# Patient Record
Sex: MALE | Race: WHITE
[De-identification: names, ages, dates, MRNs, and addresses within clinical notes are randomized per-mention and may not be internally consistent; named-entity substitution may affect disease eponyms.]

---

## 2018-11-28 ENCOUNTER — HOSPITAL ENCOUNTER (EMERGENCY)
Dept: HOSPITAL 74 - FER | Age: 38
Discharge: HOME | End: 2018-11-28
Payer: COMMERCIAL

## 2018-11-28 VITALS — DIASTOLIC BLOOD PRESSURE: 100 MMHG | HEART RATE: 88 BPM | SYSTOLIC BLOOD PRESSURE: 147 MMHG | TEMPERATURE: 98.4 F

## 2018-11-28 VITALS — BODY MASS INDEX: 25.4 KG/M2

## 2018-11-28 DIAGNOSIS — F41.9: ICD-10-CM
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
## 2018-11-28 DIAGNOSIS — M79.18: Primary | ICD-10-CM
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
## 2018-11-28 NOTE — PDOC
History of Present Illness





- General


Chief Complaint: Injury


Stated Complaint: fell down stairs


Time Seen by Provider: 11/28/18 11:05


History Source: Patient


Exam Limitations: No Limitations





- History of Present Illness


Initial Comments: 





11/28/18 11:05








Mr Carney is a 37 yo M who presents ambulatory to the ER with a complaint of 

body aches s/p fall down stairs


Pt states he slipped and fell down 10 stairs yesterday at approximately 6:00pm


Pt was able to get himself to standing


No LOC, No amnesia


After this happened, he was able to get up on his own


No weakness


(+) dizziness


No abdominal pain


(+) Right shoulder pain


(+) right hip pain





PMH: Anxiety


PSH: Denies


Meds: Valium prn


ALL: PCN


Social: Denies alcohol use


FH: non contributory





ROS:


GENERAL/CONSTITUTIONAL: No: fever, chills, weakness, loss of appetite.


HEAD, EYES, EARS, NOSE AND THROAT: No: change in vision, ear pain, discharge, 

sore throat, throat swelling.


CARDIOVASCULAR: Yes: dizziness No: chest pain, palpitations, syncope


RESPIRATORY: No: cough, shortness of breath 


GASTROINTESTINAL: No: nausea, vomiting, diarrhea, abdominal pain


GENITOURINARY: No: dysuria, hematuria 


MUSCULOSKELETAL: Yes Right hip pain No: back pain, neck pain, muscle swelling 

or pain


SKIN: No: lesions, pallor, rash or easy bruising.


NEUROLOGIC: Yes: vertigo  No: headache,  paresthesias, weakness 


 





PE:


GENERAL: The patient is in no acute distress, pt appears anxious.


HEAD: Normal with no signs of trauma.


EYES: PERRLA, EOMI, sclera anicteric, conjunctiva clear.


ENT: Ears normal, nares patent, oropharynx clear without exudates.  Moist 

mucous membranes. no hemotympanum


NECK: Normal range of motion, supple without midline tenderness


LUNGS: Breath sounds equal, clear to auscultation bilaterally.  No wheezes, and 

no crackles.


HEART:Regular rate and rhythm, normal S1 and S2 without murmur, rub or gallop.


ABDOMEN: Soft, nontender, normoactive bowel sounds.  No guarding, no rebound.   


EXTREMITIES: Normal range of motion, no edema.   


NEUROLOGICAL: Cranial nerves II through XII grossly intact.  Normal speech.  No 

focal neurological deficits. 


MUSCULOSKELETAL:  Back non-tender to palpation, right hip is tender to palpation

, no deformity,


SKIN: superficial abrasion of the right hip





11/28/18 11:32








Past History





- Past Medical History


Allergies/Adverse Reactions: 


 Allergies











Allergy/AdvReac Type Severity Reaction Status Date / Time


 


Penicillins Allergy   Verified 11/28/18 11:04











Home Medications: 


Ambulatory Orders





Diazepam [Valium] 5 mg PO ONCE PRN 03/23/14 


Acetaminophen W/ Codeine #3 [Tylenol # 3 -] 1 tab PO TID PRN #12 tablet MDD 3 11 /28/18 


Aspirin [ASA -] 81 mg PO PRN PRN 11/28/18 


Lidocaine 5% Patch [Lidoderm Patch -] 1 patch TP DAILY PRN #30 patch 11/28/18 


Methocarbamol [Robaxin -] 500 mg PO TID PRN #30 tablet 11/28/18 


Naproxen Sodium 220 mg PO BID PRN #30 tablet 11/28/18 








Psychiatric Problems: Yes (ANXIETY)





- Suicide/Smoking/Psychosocial Hx


Smoking History: Never smoked


Number of Cigarettes Smoked Daily: 0


Hx Alcohol Use: Yes ("SOCIAL")





Medical Decision Making





- Medical Decision Making





11/28/18 11:47


Will do CT head


Will do Xray shoulder and hip


11/28/18 12:28








CT head - negative


XRay Shoulder - negative


XRay Hip - appears negative





Will discharge to home on Robaxin, Aleeve, Lidoderm patches


Tylenol #3 for severe pain


Follow up with podiatry





*DC/Admit/Observation/Transfer


Diagnosis at time of Disposition: 


 Musculoskeletal pain








- Discharge Dispostion


Disposition: HOME


Condition at time of disposition: Stable


Decision to Admit order: No





- Referrals





- Patient Instructions


Printed Discharge Instructions:  [facility]


Additional Instructions: 


Mr Carney





Thank you for coming in to the ER today


Please be sure to take medications as prescribed


Please follow up with your primary care physician within 1 week


Return to the ER if needed for any other concerns or complaints





- Post Discharge Activity


Forms/Work/School Notes:  Back to Work

## 2023-12-06 ENCOUNTER — RX ONLY (RX ONLY)
Age: 43
End: 2023-12-06

## 2023-12-06 ENCOUNTER — OFFICE (OUTPATIENT)
Dept: URBAN - METROPOLITAN AREA CLINIC 122 | Facility: CLINIC | Age: 43
Setting detail: OPHTHALMOLOGY
End: 2023-12-06
Payer: COMMERCIAL

## 2023-12-06 DIAGNOSIS — H43.393: ICD-10-CM

## 2023-12-06 DIAGNOSIS — H16.223: ICD-10-CM

## 2023-12-06 PROCEDURE — 99213 OFFICE O/P EST LOW 20 MIN: CPT

## 2023-12-06 ASSESSMENT — CONFRONTATIONAL VISUAL FIELD TEST (CVF)
OS_FINDINGS: FULL
OD_FINDINGS: FULL

## 2023-12-06 ASSESSMENT — SUPERFICIAL PUNCTATE KERATITIS (SPK)
OD_SPK: 1+
OS_SPK: 1+

## 2023-12-06 ASSESSMENT — DRY EYES - PHYSICIAN NOTES
OS_GENERALCOMMENTS: INFERIOR SPK
OD_GENERALCOMMENTS: INFERIOR SPK

## 2025-08-11 PROBLEM — Z00.00 ENCOUNTER FOR PREVENTIVE HEALTH EXAMINATION: Status: ACTIVE | Noted: 2025-08-11

## 2025-08-14 DIAGNOSIS — M25.511 PAIN IN RIGHT SHOULDER: ICD-10-CM

## 2025-08-18 ENCOUNTER — APPOINTMENT (OUTPATIENT)
Dept: ORTHOPEDIC SURGERY | Facility: CLINIC | Age: 45
End: 2025-08-18
Payer: COMMERCIAL

## 2025-08-18 VITALS
OXYGEN SATURATION: 99 % | DIASTOLIC BLOOD PRESSURE: 74 MMHG | SYSTOLIC BLOOD PRESSURE: 118 MMHG | RESPIRATION RATE: 16 BRPM | HEART RATE: 88 BPM | HEIGHT: 71 IN | WEIGHT: 175 LBS | BODY MASS INDEX: 24.5 KG/M2

## 2025-08-18 DIAGNOSIS — M25.811 OTHER SPECIFIED JOINT DISORDERS, RIGHT SHOULDER: ICD-10-CM

## 2025-08-18 DIAGNOSIS — S43.431A SUPERIOR GLENOID LABRUM LESION OF RIGHT SHOULDER, INITIAL ENCOUNTER: ICD-10-CM

## 2025-08-18 DIAGNOSIS — M75.21 BICIPITAL TENDINITIS, RIGHT SHOULDER: ICD-10-CM

## 2025-08-18 PROCEDURE — 99204 OFFICE O/P NEW MOD 45 MIN: CPT | Mod: 25

## 2025-08-18 PROCEDURE — 20611 DRAIN/INJ JOINT/BURSA W/US: CPT | Mod: RT

## 2025-08-18 PROCEDURE — 73030 X-RAY EXAM OF SHOULDER: CPT | Mod: RT
